# Patient Record
Sex: FEMALE | NOT HISPANIC OR LATINO | Employment: OTHER | URBAN - METROPOLITAN AREA
[De-identification: names, ages, dates, MRNs, and addresses within clinical notes are randomized per-mention and may not be internally consistent; named-entity substitution may affect disease eponyms.]

---

## 2017-12-21 ENCOUNTER — HOSPITAL ENCOUNTER (OUTPATIENT)
Dept: GENERAL RADIOLOGY | Facility: HOSPITAL | Age: 82
Discharge: HOME | End: 2017-12-21
Admitting: FAMILY MEDICINE

## 2017-12-21 ENCOUNTER — HOSPITAL ENCOUNTER (OUTPATIENT)
Dept: GENERAL RADIOLOGY | Facility: HOSPITAL | Age: 82
Discharge: HOME | End: 2017-12-21

## 2017-12-21 DIAGNOSIS — M25.571 PAIN AND SWELLING OF ANKLE, RIGHT: ICD-10-CM

## 2017-12-21 DIAGNOSIS — M25.471 PAIN AND SWELLING OF ANKLE, RIGHT: ICD-10-CM

## 2017-12-21 PROCEDURE — 73610 X-RAY EXAM OF ANKLE: CPT

## 2017-12-21 PROCEDURE — 73630 X-RAY EXAM OF FOOT: CPT

## 2018-06-07 ENCOUNTER — HOSPITAL ENCOUNTER (INPATIENT)
Facility: HOSPITAL | Age: 83
LOS: 4 days | Discharge: NURSING, REHAB OR LONG-TERM CARE | End: 2018-06-11
Attending: EMERGENCY MEDICINE | Admitting: INTERNAL MEDICINE

## 2018-06-07 ENCOUNTER — APPOINTMENT (OUTPATIENT)
Dept: CT IMAGING | Facility: HOSPITAL | Age: 83
End: 2018-06-07

## 2018-06-07 ENCOUNTER — APPOINTMENT (OUTPATIENT)
Dept: GENERAL RADIOLOGY | Facility: HOSPITAL | Age: 83
End: 2018-06-07

## 2018-06-07 DIAGNOSIS — R29.6 MULTIPLE FALLS: ICD-10-CM

## 2018-06-07 DIAGNOSIS — G93.41 METABOLIC ENCEPHALOPATHY: ICD-10-CM

## 2018-06-07 DIAGNOSIS — Z74.09 IMPAIRED FUNCTIONAL MOBILITY AND ACTIVITY TOLERANCE: ICD-10-CM

## 2018-06-07 DIAGNOSIS — N39.0 ACUTE UTI: Primary | ICD-10-CM

## 2018-06-07 LAB
ALBUMIN SERPL-MCNC: 4.2 G/DL (ref 3.5–5.2)
ALBUMIN/GLOB SERPL: 1.5 G/DL
ALP SERPL-CCNC: 48 U/L (ref 39–117)
ALT SERPL W P-5'-P-CCNC: 17 U/L (ref 1–33)
ANION GAP SERPL CALCULATED.3IONS-SCNC: 13.5 MMOL/L
AST SERPL-CCNC: 31 U/L (ref 1–32)
BACTERIA UR QL AUTO: ABNORMAL /HPF
BASOPHILS # BLD AUTO: 0.02 10*3/MM3 (ref 0–0.2)
BASOPHILS NFR BLD AUTO: 0.2 % (ref 0–1.5)
BILIRUB SERPL-MCNC: 0.6 MG/DL (ref 0.1–1.2)
BILIRUB UR QL STRIP: NEGATIVE
BUN BLD-MCNC: 29 MG/DL (ref 8–23)
BUN/CREAT SERPL: 32.2 (ref 7–25)
CALCIUM SPEC-SCNC: 9.9 MG/DL (ref 8.6–10.5)
CHLORIDE SERPL-SCNC: 97 MMOL/L (ref 98–107)
CLARITY UR: ABNORMAL
CO2 SERPL-SCNC: 28.5 MMOL/L (ref 22–29)
COLOR UR: YELLOW
CREAT BLD-MCNC: 0.9 MG/DL (ref 0.57–1)
DEPRECATED RDW RBC AUTO: 44.7 FL (ref 37–54)
EOSINOPHIL # BLD AUTO: 0.05 10*3/MM3 (ref 0–0.7)
EOSINOPHIL NFR BLD AUTO: 0.5 % (ref 0.3–6.2)
ERYTHROCYTE [DISTWIDTH] IN BLOOD BY AUTOMATED COUNT: 13.7 % (ref 11.7–13)
GFR SERPL CREATININE-BSD FRML MDRD: 59 ML/MIN/1.73
GLOBULIN UR ELPH-MCNC: 2.8 GM/DL
GLUCOSE BLD-MCNC: 101 MG/DL (ref 65–99)
GLUCOSE UR STRIP-MCNC: NEGATIVE MG/DL
HCT VFR BLD AUTO: 34.4 % (ref 35.6–45.5)
HGB BLD-MCNC: 11.4 G/DL (ref 11.9–15.5)
HGB UR QL STRIP.AUTO: NEGATIVE
HYALINE CASTS UR QL AUTO: ABNORMAL /LPF
IMM GRANULOCYTES # BLD: 0.01 10*3/MM3 (ref 0–0.03)
IMM GRANULOCYTES NFR BLD: 0.1 % (ref 0–0.5)
INR PPP: 0.99 (ref 0.9–1.1)
KETONES UR QL STRIP: ABNORMAL
LEUKOCYTE ESTERASE UR QL STRIP.AUTO: ABNORMAL
LYMPHOCYTES # BLD AUTO: 1.61 10*3/MM3 (ref 0.9–4.8)
LYMPHOCYTES NFR BLD AUTO: 16.6 % (ref 19.6–45.3)
MAGNESIUM SERPL-MCNC: 2 MG/DL (ref 1.6–2.4)
MCH RBC QN AUTO: 29 PG (ref 26.9–32)
MCHC RBC AUTO-ENTMCNC: 33.1 G/DL (ref 32.4–36.3)
MCV RBC AUTO: 87.5 FL (ref 80.5–98.2)
MONOCYTES # BLD AUTO: 0.88 10*3/MM3 (ref 0.2–1.2)
MONOCYTES NFR BLD AUTO: 9.1 % (ref 5–12)
NEUTROPHILS # BLD AUTO: 7.12 10*3/MM3 (ref 1.9–8.1)
NEUTROPHILS NFR BLD AUTO: 73.6 % (ref 42.7–76)
NITRITE UR QL STRIP: POSITIVE
PH UR STRIP.AUTO: 6 [PH] (ref 5–8)
PLATELET # BLD AUTO: 230 10*3/MM3 (ref 140–500)
PMV BLD AUTO: 10.3 FL (ref 6–12)
POTASSIUM BLD-SCNC: 3.4 MMOL/L (ref 3.5–5.2)
PROT SERPL-MCNC: 7 G/DL (ref 6–8.5)
PROT UR QL STRIP: NEGATIVE
PROTHROMBIN TIME: 12.9 SECONDS (ref 11.7–14.2)
RBC # BLD AUTO: 3.93 10*6/MM3 (ref 3.9–5.2)
RBC # UR: ABNORMAL /HPF
REF LAB TEST METHOD: ABNORMAL
SODIUM BLD-SCNC: 139 MMOL/L (ref 136–145)
SP GR UR STRIP: 1.02 (ref 1–1.03)
SQUAMOUS #/AREA URNS HPF: ABNORMAL /HPF
T4 FREE SERPL-MCNC: 1.51 NG/DL (ref 0.93–1.7)
TROPONIN T SERPL-MCNC: <0.01 NG/ML (ref 0–0.03)
TSH SERPL DL<=0.05 MIU/L-ACNC: 3.14 MIU/ML (ref 0.27–4.2)
UROBILINOGEN UR QL STRIP: ABNORMAL
WBC NRBC COR # BLD: 9.68 10*3/MM3 (ref 4.5–10.7)
WBC UR QL AUTO: ABNORMAL /HPF

## 2018-06-07 PROCEDURE — 81001 URINALYSIS AUTO W/SCOPE: CPT | Performed by: EMERGENCY MEDICINE

## 2018-06-07 PROCEDURE — 84439 ASSAY OF FREE THYROXINE: CPT | Performed by: EMERGENCY MEDICINE

## 2018-06-07 PROCEDURE — 73630 X-RAY EXAM OF FOOT: CPT

## 2018-06-07 PROCEDURE — 84443 ASSAY THYROID STIM HORMONE: CPT | Performed by: EMERGENCY MEDICINE

## 2018-06-07 PROCEDURE — 99285 EMERGENCY DEPT VISIT HI MDM: CPT

## 2018-06-07 PROCEDURE — 85610 PROTHROMBIN TIME: CPT | Performed by: EMERGENCY MEDICINE

## 2018-06-07 PROCEDURE — 87086 URINE CULTURE/COLONY COUNT: CPT | Performed by: HOSPITALIST

## 2018-06-07 PROCEDURE — 70450 CT HEAD/BRAIN W/O DYE: CPT

## 2018-06-07 PROCEDURE — 87186 SC STD MICRODIL/AGAR DIL: CPT | Performed by: HOSPITALIST

## 2018-06-07 PROCEDURE — 93010 ELECTROCARDIOGRAM REPORT: CPT | Performed by: INTERNAL MEDICINE

## 2018-06-07 PROCEDURE — 83735 ASSAY OF MAGNESIUM: CPT | Performed by: EMERGENCY MEDICINE

## 2018-06-07 PROCEDURE — 71046 X-RAY EXAM CHEST 2 VIEWS: CPT

## 2018-06-07 PROCEDURE — 85025 COMPLETE CBC W/AUTO DIFF WBC: CPT | Performed by: EMERGENCY MEDICINE

## 2018-06-07 PROCEDURE — 93005 ELECTROCARDIOGRAM TRACING: CPT | Performed by: EMERGENCY MEDICINE

## 2018-06-07 PROCEDURE — 80053 COMPREHEN METABOLIC PANEL: CPT | Performed by: EMERGENCY MEDICINE

## 2018-06-07 PROCEDURE — 25010000002 CEFTRIAXONE PER 250 MG: Performed by: EMERGENCY MEDICINE

## 2018-06-07 PROCEDURE — 84484 ASSAY OF TROPONIN QUANT: CPT | Performed by: EMERGENCY MEDICINE

## 2018-06-07 RX ORDER — HYDRALAZINE HYDROCHLORIDE 50 MG/1
50 TABLET, FILM COATED ORAL 2 TIMES DAILY
Status: DISCONTINUED | OUTPATIENT
Start: 2018-06-08 | End: 2018-06-11 | Stop reason: HOSPADM

## 2018-06-07 RX ORDER — CARVEDILOL 25 MG/1
25 TABLET ORAL 2 TIMES DAILY WITH MEALS
COMMUNITY

## 2018-06-07 RX ORDER — ISOSORBIDE MONONITRATE 60 MG/1
60 TABLET, EXTENDED RELEASE ORAL DAILY
COMMUNITY

## 2018-06-07 RX ORDER — ONDANSETRON 2 MG/ML
4 INJECTION INTRAMUSCULAR; INTRAVENOUS EVERY 6 HOURS PRN
Status: DISCONTINUED | OUTPATIENT
Start: 2018-06-07 | End: 2018-06-11 | Stop reason: HOSPADM

## 2018-06-07 RX ORDER — ATORVASTATIN CALCIUM 10 MG/1
10 TABLET, FILM COATED ORAL DAILY
COMMUNITY

## 2018-06-07 RX ORDER — ISOSORBIDE MONONITRATE 60 MG/1
60 TABLET, EXTENDED RELEASE ORAL DAILY
Status: DISCONTINUED | OUTPATIENT
Start: 2018-06-08 | End: 2018-06-11 | Stop reason: HOSPADM

## 2018-06-07 RX ORDER — HYDRALAZINE HYDROCHLORIDE 50 MG/1
50 TABLET, FILM COATED ORAL 2 TIMES DAILY
COMMUNITY

## 2018-06-07 RX ORDER — SODIUM CHLORIDE 0.9 % (FLUSH) 0.9 %
1-10 SYRINGE (ML) INJECTION AS NEEDED
Status: DISCONTINUED | OUTPATIENT
Start: 2018-06-07 | End: 2018-06-10

## 2018-06-07 RX ORDER — ONDANSETRON 4 MG/1
4 TABLET, FILM COATED ORAL EVERY 6 HOURS PRN
Status: DISCONTINUED | OUTPATIENT
Start: 2018-06-07 | End: 2018-06-11 | Stop reason: HOSPADM

## 2018-06-07 RX ORDER — CEFTRIAXONE SODIUM 1 G/50ML
1 INJECTION, SOLUTION INTRAVENOUS ONCE
Status: COMPLETED | OUTPATIENT
Start: 2018-06-07 | End: 2018-06-07

## 2018-06-07 RX ORDER — SODIUM CHLORIDE AND POTASSIUM CHLORIDE 150; 900 MG/100ML; MG/100ML
100 INJECTION, SOLUTION INTRAVENOUS CONTINUOUS
Status: DISCONTINUED | OUTPATIENT
Start: 2018-06-08 | End: 2018-06-08

## 2018-06-07 RX ORDER — CEFTRIAXONE SODIUM 1 G/50ML
1 INJECTION, SOLUTION INTRAVENOUS EVERY 24 HOURS
Status: DISCONTINUED | OUTPATIENT
Start: 2018-06-08 | End: 2018-06-09

## 2018-06-07 RX ORDER — ATORVASTATIN CALCIUM 10 MG/1
10 TABLET, FILM COATED ORAL DAILY
Status: DISCONTINUED | OUTPATIENT
Start: 2018-06-08 | End: 2018-06-11 | Stop reason: HOSPADM

## 2018-06-07 RX ORDER — CARVEDILOL 25 MG/1
25 TABLET ORAL 2 TIMES DAILY WITH MEALS
Status: DISCONTINUED | OUTPATIENT
Start: 2018-06-08 | End: 2018-06-11 | Stop reason: HOSPADM

## 2018-06-07 RX ORDER — ACETAMINOPHEN 325 MG/1
650 TABLET ORAL EVERY 4 HOURS PRN
Status: DISCONTINUED | OUTPATIENT
Start: 2018-06-07 | End: 2018-06-11 | Stop reason: HOSPADM

## 2018-06-07 RX ORDER — HYDROCHLOROTHIAZIDE 25 MG/1
25 TABLET ORAL DAILY
COMMUNITY
End: 2018-06-11 | Stop reason: HOSPADM

## 2018-06-07 RX ORDER — ONDANSETRON 4 MG/1
4 TABLET, ORALLY DISINTEGRATING ORAL EVERY 6 HOURS PRN
Status: DISCONTINUED | OUTPATIENT
Start: 2018-06-07 | End: 2018-06-11 | Stop reason: HOSPADM

## 2018-06-07 RX ORDER — SODIUM CHLORIDE 9 MG/ML
125 INJECTION, SOLUTION INTRAVENOUS CONTINUOUS
Status: DISCONTINUED | OUTPATIENT
Start: 2018-06-07 | End: 2018-06-07

## 2018-06-07 RX ORDER — SODIUM CHLORIDE 0.9 % (FLUSH) 0.9 %
10 SYRINGE (ML) INJECTION AS NEEDED
Status: DISCONTINUED | OUTPATIENT
Start: 2018-06-07 | End: 2018-06-11 | Stop reason: HOSPADM

## 2018-06-07 RX ADMIN — CEFTRIAXONE SODIUM 1 G: 1 INJECTION, SOLUTION INTRAVENOUS at 21:15

## 2018-06-07 RX ADMIN — SODIUM CHLORIDE 125 ML/HR: 9 INJECTION, SOLUTION INTRAVENOUS at 21:15

## 2018-06-07 NOTE — ED PROVIDER NOTES
" EMERGENCY DEPARTMENT ENCOUNTER    CHIEF COMPLAINT  Chief Complaint: AMS  History given by: patient, daughter at bedside.   History limited by: AMS  Room Number: P686/1  PMD: Miriam Mercado Reyes, MD      HPI:  Pt is a 87 y.o. female who presents complaining of AMS described as paranoia and a \"decline in her health\" per daughter for the past four days. Family notes increased forgetfulness and paranoia stating that people were in her house, setting off her medical alert and house alarm off several times. Family states that the patient fell two days ago and today.     Patient notes LLE weakness. Patient has had recent falls, but denies any injury from this.     Duration:  Four days ago  Onset: gradual  Timing: constant  Location: Neuro  Radiation: none  Quality: AMS  Intensity/Severity: moderate  Progression: unchanged  Associated Symptoms: paranoia, LLE weaknes  Aggravating Factors: none  Alleviating Factors: none  Previous Episodes: not stated  Treatment before arrival: none    PAST MEDICAL HISTORY  Active Ambulatory Problems     Diagnosis Date Noted   • No Active Ambulatory Problems     Resolved Ambulatory Problems     Diagnosis Date Noted   • No Resolved Ambulatory Problems     Past Medical History:   Diagnosis Date   • Coronary artery disease    • Hypertension    • Polio        PAST SURGICAL HISTORY  Past Surgical History:   Procedure Laterality Date   • CARDIAC SURGERY         FAMILY HISTORY  History reviewed. No pertinent family history.    SOCIAL HISTORY  Social History     Social History   • Marital status:      Spouse name: N/A   • Number of children: N/A   • Years of education: N/A     Occupational History   • Not on file.     Social History Main Topics   • Smoking status: Never Smoker   • Smokeless tobacco: Never Used   • Alcohol use No   • Drug use: No   • Sexual activity: Defer     Other Topics Concern   • Not on file     Social History Narrative   • No narrative on file       ALLERGIES  Sulfa " antibiotics    REVIEW OF SYSTEMS  Review of Systems   Constitutional: Negative for fever.   HENT: Negative for sore throat.    Eyes: Negative.    Respiratory: Negative for cough and shortness of breath.    Cardiovascular: Negative for chest pain.   Gastrointestinal: Negative for abdominal pain, diarrhea and vomiting.   Genitourinary: Negative for dysuria.   Musculoskeletal: Negative for neck pain.   Skin: Negative for rash.   Allergic/Immunologic: Negative.    Neurological: Positive for weakness (LLE). Negative for numbness and headaches.   Hematological: Negative.    Psychiatric/Behavioral: Positive for confusion and hallucinations.   All other systems reviewed and are negative.      PHYSICAL EXAM  ED Triage Vitals [06/07/18 1813]   Temp Heart Rate Resp BP SpO2   98.5 °F (36.9 °C) 86 16 160/80 99 %     Physical Exam   Constitutional: No distress.   HENT:   Head: Normocephalic and atraumatic.   Mouth/Throat: Mucous membranes are dry.   Eyes: EOM are normal. Pupils are equal, round, and reactive to light.   Pale conjunctiva   Neck: Normal range of motion. Neck supple.   Cardiovascular: Normal rate, regular rhythm and normal heart sounds.    Pulmonary/Chest: Effort normal and breath sounds normal. No respiratory distress.   Abdominal: Soft. Bowel sounds are normal. There is no tenderness. There is no rebound and no guarding.   Musculoskeletal: Normal range of motion. She exhibits edema (2+ in the LLE and 1+ in the LLE).   Bruise to the L flank area. No C, T, or L spine tenderness    Bruising  L fourth digit   Neurological: She is alert. She has normal sensation and normal strength.   Patient is oriented to person, not place or time.   Skin: Skin is warm and dry. No rash noted.   Psychiatric: Mood and affect normal.   Nursing note and vitals reviewed.      LAB RESULTS  Lab Results (last 24 hours)     Procedure Component Value Units Date/Time    CBC & Differential [[id number]] Collected:  06/07/18 1957    Specimen:   Blood Updated:  06/07/18 2011    Narrative:       The following orders were created for panel order CBC & Differential.  Procedure                               Abnormality         Status                     ---------                               -----------         ------                     CBC Auto Differential[[id number]]        Abnormal            Final result                 Please view results for these tests on the individual orders.    Comprehensive Metabolic Panel [[id number]]  (Abnormal) Collected:  06/07/18 1957    Specimen:  Blood Updated:  06/07/18 2030     Glucose 101 (H) mg/dL      BUN 29 (H) mg/dL      Creatinine 0.90 mg/dL      Sodium 139 mmol/L      Potassium 3.4 (L) mmol/L      Chloride 97 (L) mmol/L      CO2 28.5 mmol/L      Calcium 9.9 mg/dL      Total Protein 7.0 g/dL      Albumin 4.20 g/dL      ALT (SGPT) 17 U/L      AST (SGOT) 31 U/L      Comment: Specimen hemolyzed.  Results may be affected.        Alkaline Phosphatase 48 U/L      Total Bilirubin 0.6 mg/dL      eGFR Non African Amer 59 (L) mL/min/1.73      Globulin 2.8 gm/dL      A/G Ratio 1.5 g/dL      BUN/Creatinine Ratio 32.2 (H)     Anion Gap 13.5 mmol/L     Narrative:       The MDRD GFR formula is only valid for adults with stable renal function between ages 18 and 70.    Protime-INR [[id number]]  (Normal) Collected:  06/07/18 1957    Specimen:  Blood Updated:  06/07/18 2024     Protime 12.9 Seconds      INR 0.99    Troponin [[id number]]  (Normal) Collected:  06/07/18 1957    Specimen:  Blood Updated:  06/07/18 2042     Troponin T <0.010 ng/mL     Narrative:       Troponin T Reference Ranges:  Less than 0.03 ng/mL:    Negative for AMI  0.03 to 0.09 ng/mL:      Indeterminant for AMI  Greater than 0.09 ng/mL: Positive for AMI    Magnesium [[id number]]  (Normal) Collected:  06/07/18 1957    Specimen:  Blood Updated:  06/07/18 2030     Magnesium 2.0 mg/dL     TSH [[id number]]  (Normal) Collected:  06/07/18 1957    Specimen:  Blood Updated:   06/07/18 2044     TSH 3.140 mIU/mL     T4, Free [[id number]]  (Normal) Collected:  06/07/18 1957    Specimen:  Blood Updated:  06/07/18 2044     Free T4 1.51 ng/dL     CBC Auto Differential [[id number]]  (Abnormal) Collected:  06/07/18 1957    Specimen:  Blood Updated:  06/07/18 2011     WBC 9.68 10*3/mm3      RBC 3.93 10*6/mm3      Hemoglobin 11.4 (L) g/dL      Hematocrit 34.4 (L) %      MCV 87.5 fL      MCH 29.0 pg      MCHC 33.1 g/dL      RDW 13.7 (H) %      RDW-SD 44.7 fl      MPV 10.3 fL      Platelets 230 10*3/mm3      Neutrophil % 73.6 %      Lymphocyte % 16.6 (L) %      Monocyte % 9.1 %      Eosinophil % 0.5 %      Basophil % 0.2 %      Immature Grans % 0.1 %      Neutrophils, Absolute 7.12 10*3/mm3      Lymphocytes, Absolute 1.61 10*3/mm3      Monocytes, Absolute 0.88 10*3/mm3      Eosinophils, Absolute 0.05 10*3/mm3      Basophils, Absolute 0.02 10*3/mm3      Immature Grans, Absolute 0.01 10*3/mm3     Urinalysis With / Microscopic If Indicated (No Culture) - Urine, Catheter [[id number]]  (Abnormal) Collected:  06/07/18 2008    Specimen:  Urine from Urine, Catheter Updated:  06/07/18 2022     Color, UA Yellow     Appearance, UA Cloudy (A)     pH, UA 6.0     Specific Gravity, UA 1.024     Glucose, UA Negative     Ketones, UA Trace (A)     Bilirubin, UA Negative     Blood, UA Negative     Protein, UA Negative     Leuk Esterase, UA Moderate (2+) (A)     Nitrite, UA Positive (A)     Urobilinogen, UA 0.2 E.U./dL    Urinalysis, Microscopic Only - Urine, Clean Catch [[id number]]  (Abnormal) Collected:  06/07/18 2008    Specimen:  Urine from Urine, Catheter Updated:  06/07/18 2022     RBC, UA 3-5 (A) /HPF      WBC, UA Too Numerous to Count (A) /HPF      Bacteria, UA 4+ (A) /HPF      Squamous Epithelial Cells, UA 0-2 /HPF      Hyaline Casts, UA 7-12 /LPF      Methodology Automated Microscopy    Urine Culture - Urine, [[id number]] Collected:  06/07/18 2142    Specimen:  Urine from Urine, Clean Catch Updated:  06/07/18  2144          I ordered the above labs and reviewed the results    RADIOLOGY  CT Head Without Contrast   Final Result           No acute intracranial hemorrhage or hydrocephalus. Chronic-appearing   changes of the brain, with interval progression of chronic ischemic   changes since 2010. If there is further clinical concern, MRI could be   considered for further evaluation.       A new sclerotic focus at the right skull base, bone scan could be   considered for further evaluation.       This report was finalized on 6/7/2018 8:33 PM by Dr. Tremaine Kilgore M.D.          XR Foot 3+ View Left   Final Result       No acute fracture is identified. Soft tissue swelling. If there is   further clinical concern, MRI could be considered for further   evaluation.       This report was finalized on 6/7/2018 8:05 PM by Dr. Tremaine Kilgore M.D.          XR Chest 2 View   Final Result   No focal pulmonary consolidation. Tortuous aorta. Follow-up   as clinically indicated.       This report was finalized on 6/7/2018 8:02 PM by Dr. Tremaine Kilgore M.D.               I ordered the above noted radiological studies. Interpreted by radiologist.  Reviewed by me in PACS.       PROCEDURES  Procedures  EKG    EKG time: 1931  Rhythm/Rate: NSR, 78  No Acute Ischemia  Non-Specific ST-T changes  T wave inversion in V2-V6    unchanged compared to prior on 2010    Interpreted Contemporaneously by me.  Independently viewed by me    Patient is not a TPA candidate due to time of onset being greater than 4.5 hours.    Interval: baseline  1a. Level of Consciousness: 0-->Alert, keenly responsive  1b. LOC Questions: 1-->Answers one question correctly  1c. LOC Commands: 0-->Performs both tasks correctly  2. Best Gaze: 0-->Normal  3. Visual: 0-->No visual loss  4. Facial Palsy: 0-->Normal symmetrical movements  5a. Motor Arm, Left: 0-->No drift, limb holds 90 (or 45) degrees for full 10 secs  5b. Motor Arm, Right: 0-->No drift, limb holds 90  (or 45) degrees for full 10 secs  6a. Motor Leg, Left: 0-->No drift, leg holds 30 degree position for full 5 secs  6b. Motor Leg, Right: 0-->No drift, leg holds 30 degree position for full 5 secs  7. Limb Ataxia: 0-->Absent  8. Sensory: 0-->Normal, no sensory loss  9. Best Language: 0-->No aphasia, normal  10. Dysarthria: 0-->Normal  11. Extinction and Inattention (formerly Neglect): 0-->No abnormality    Total (NIH Stroke Scale): 1    PROGRESS AND CONSULTS     1855: I discussed plan for labs, UA, and HEad CT to be ordered in the ED. Pt and daughter understands and agrees with the plan, all questions answered.    1907: labs and radiological studies ordered.     2040: Discussed case with Dr. Jony Syed, [facility] hospitalist concerning the patient and the findings in the ED. Dr. Syed requests admittance to med-surg.     2045: rechecked pt. Family at bedside. I informed the patient ad family of UTI diagnosis, concerns for dehydration and admittance to the hospital for IV fluids and IV abx. Pt understands and agrees with the plan, all questions answered.    MEDICAL DECISION MAKING  Results were reviewed/discussed with the patient and they were also made aware of online access. Pt also made aware that some labs, such as cultures, will not be resulted during ER visit and follow up with PMD is necessary.     MDM  Number of Diagnoses or Management Options  Acute UTI:   Metabolic encephalopathy:   Multiple falls:      Amount and/or Complexity of Data Reviewed  Clinical lab tests: reviewed and ordered (UA UTI, BUN 29, creatinine 0.90, WBC 9.68, magnesium 2.0)  Tests in the radiology section of CPT®: reviewed and ordered (Head CT: no acute abnormality. Chest Xray: no acute process. Foot Xray: no osteomyelitis)  Tests in the medicine section of CPT®: reviewed and ordered (EKG Documented in procedure section)  Discuss the patient with other providers: yes (hospitalist)    Patient Progress  Patient progress: stable          DIAGNOSIS  Final diagnoses:   Acute UTI   Metabolic encephalopathy   Multiple falls       DISPOSITION  ADMISSION    Discussed treatment plan and reason for admission with pt/family and admitting physician.  Pt/family voiced understanding of the plan for admission for further testing/treatment as needed.       Latest Documented Vital Signs:  As of 1:32 AM  BP- 152/85 HR- 69 Temp- 97.7 °F (36.5 °C) (Oral) O2 sat- 91%    --  Documentation assistance provided by melba Valdez for Jovany Mccollum MD.  Information recorded by the scribe was done at my direction and has been verified and validated by me.     Jayne Valdez  06/07/18 7242       Norbert Mccollum MD  06/08/18 9123

## 2018-06-07 NOTE — ED TRIAGE NOTES
Family reports bizarre behavior for 4 days. Neighbors report multiple falls. Patient c/o dysuria and urinary frequency.

## 2018-06-08 PROBLEM — G93.41 ACUTE METABOLIC ENCEPHALOPATHY: Status: ACTIVE | Noted: 2018-06-08

## 2018-06-08 PROBLEM — I25.10 CAD (CORONARY ARTERY DISEASE): Status: ACTIVE | Noted: 2018-06-08

## 2018-06-08 PROBLEM — F03.90 DEMENTIA (HCC): Status: ACTIVE | Noted: 2018-06-08

## 2018-06-08 PROBLEM — I10 HTN (HYPERTENSION): Status: ACTIVE | Noted: 2018-06-08

## 2018-06-08 LAB
ANION GAP SERPL CALCULATED.3IONS-SCNC: 8.1 MMOL/L
BUN BLD-MCNC: 20 MG/DL (ref 8–23)
BUN/CREAT SERPL: 25.6 (ref 7–25)
CALCIUM SPEC-SCNC: 8.6 MG/DL (ref 8.6–10.5)
CHLORIDE SERPL-SCNC: 102 MMOL/L (ref 98–107)
CO2 SERPL-SCNC: 30.9 MMOL/L (ref 22–29)
CREAT BLD-MCNC: 0.78 MG/DL (ref 0.57–1)
DEPRECATED RDW RBC AUTO: 44.8 FL (ref 37–54)
EOSINOPHIL # BLD MANUAL: 0.15 10*3/MM3 (ref 0–0.7)
EOSINOPHIL NFR BLD MANUAL: 2 % (ref 0.3–6.2)
ERYTHROCYTE [DISTWIDTH] IN BLOOD BY AUTOMATED COUNT: 13.5 % (ref 11.7–13)
GFR SERPL CREATININE-BSD FRML MDRD: 70 ML/MIN/1.73
GLUCOSE BLD-MCNC: 94 MG/DL (ref 65–99)
HCT VFR BLD AUTO: 32.7 % (ref 35.6–45.5)
HGB BLD-MCNC: 10.5 G/DL (ref 11.9–15.5)
LYMPHOCYTES # BLD MANUAL: 1.89 10*3/MM3 (ref 0.9–4.8)
LYMPHOCYTES NFR BLD MANUAL: 26 % (ref 19.6–45.3)
LYMPHOCYTES NFR BLD MANUAL: 9 % (ref 5–12)
MAGNESIUM SERPL-MCNC: 2 MG/DL (ref 1.6–2.4)
MCH RBC QN AUTO: 28.9 PG (ref 26.9–32)
MCHC RBC AUTO-ENTMCNC: 32.1 G/DL (ref 32.4–36.3)
MCV RBC AUTO: 90.1 FL (ref 80.5–98.2)
MONOCYTES # BLD AUTO: 0.65 10*3/MM3 (ref 0.2–1.2)
NEUTROPHILS # BLD AUTO: 4.57 10*3/MM3 (ref 1.9–8.1)
NEUTROPHILS NFR BLD MANUAL: 63 % (ref 42.7–76)
PLAT MORPH BLD: NORMAL
PLATELET # BLD AUTO: 188 10*3/MM3 (ref 140–500)
PMV BLD AUTO: 9.9 FL (ref 6–12)
POTASSIUM BLD-SCNC: 3.4 MMOL/L (ref 3.5–5.2)
RBC # BLD AUTO: 3.63 10*6/MM3 (ref 3.9–5.2)
RBC MORPH BLD: NORMAL
SODIUM BLD-SCNC: 141 MMOL/L (ref 136–145)
WBC MORPH BLD: NORMAL
WBC NRBC COR # BLD: 7.25 10*3/MM3 (ref 4.5–10.7)

## 2018-06-08 PROCEDURE — 83735 ASSAY OF MAGNESIUM: CPT | Performed by: HOSPITALIST

## 2018-06-08 PROCEDURE — 25010000002 ENOXAPARIN PER 10 MG: Performed by: HOSPITALIST

## 2018-06-08 PROCEDURE — 80048 BASIC METABOLIC PNL TOTAL CA: CPT | Performed by: HOSPITALIST

## 2018-06-08 PROCEDURE — 25810000003 SODIUM CHLORIDE 0.9 % WITH KCL 20 MEQ 20-0.9 MEQ/L-% SOLUTION: Performed by: HOSPITALIST

## 2018-06-08 PROCEDURE — 85027 COMPLETE CBC AUTOMATED: CPT | Performed by: HOSPITALIST

## 2018-06-08 PROCEDURE — 85007 BL SMEAR W/DIFF WBC COUNT: CPT | Performed by: HOSPITALIST

## 2018-06-08 PROCEDURE — 25010000002 CEFTRIAXONE PER 250 MG: Performed by: HOSPITALIST

## 2018-06-08 RX ORDER — POTASSIUM CHLORIDE 750 MG/1
20 CAPSULE, EXTENDED RELEASE ORAL ONCE
Status: COMPLETED | OUTPATIENT
Start: 2018-06-08 | End: 2018-06-08

## 2018-06-08 RX ADMIN — CARVEDILOL 25 MG: 25 TABLET, FILM COATED ORAL at 18:07

## 2018-06-08 RX ADMIN — CEFTRIAXONE SODIUM 1 G: 1 INJECTION, SOLUTION INTRAVENOUS at 21:35

## 2018-06-08 RX ADMIN — ATORVASTATIN CALCIUM 10 MG: 10 TABLET, FILM COATED ORAL at 08:01

## 2018-06-08 RX ADMIN — POTASSIUM CHLORIDE 20 MEQ: 750 CAPSULE, EXTENDED RELEASE ORAL at 10:52

## 2018-06-08 RX ADMIN — HYDRALAZINE HYDROCHLORIDE 50 MG: 50 TABLET, FILM COATED ORAL at 04:18

## 2018-06-08 RX ADMIN — CARVEDILOL 25 MG: 25 TABLET, FILM COATED ORAL at 08:01

## 2018-06-08 RX ADMIN — ENOXAPARIN SODIUM 40 MG: 40 INJECTION SUBCUTANEOUS at 06:23

## 2018-06-08 RX ADMIN — ISOSORBIDE MONONITRATE 60 MG: 60 TABLET, EXTENDED RELEASE ORAL at 08:01

## 2018-06-08 RX ADMIN — POTASSIUM CHLORIDE AND SODIUM CHLORIDE 100 ML/HR: 900; 150 INJECTION, SOLUTION INTRAVENOUS at 04:18

## 2018-06-08 NOTE — PLAN OF CARE
Problem: Patient Care Overview  Goal: Plan of Care Review  Outcome: Ongoing (interventions implemented as appropriate)   06/08/18 0151   Coping/Psychosocial   Plan of Care Reviewed With patient;family   OTHER   Outcome Summary no c/o pain. incontinent. disoriented to situation. IV abx. bed alarm, hx of recent fall this week .

## 2018-06-08 NOTE — PROGRESS NOTES
"Discharge Planning Assessment  [facility]     Patient Name: Carly Sneed  MRN: [id number]  Today's Date: 6/8/2018    Admit Date: 6/7/2018          Discharge Needs Assessment     Row Name 06/08/18 1402       Living Environment    Lives With alone    Current Living Arrangements home/apartment/condo    Primary Care Provided by self    Provides Primary Care For no one, unable/limited ability to care for self    Family Caregiver if Needed child(carlton), adult    Quality of Family Relationships stressful    Able to Return to Prior Arrangements yes       Resource/Environmental Concerns    Transportation Concerns --   pt's dtr Tammy Oscar provides transportation       Transition Planning    Patient/Family Anticipates Transition to home    Patient/Family Anticipated Services at Transition none    Transportation Anticipated family or friend will provide       Discharge Needs Assessment    Readmission Within the Last 30 Days no previous admission in last 30 days    Concerns to be Addressed adjustment to diagnosis/illness;denies needs/concerns at this time    Equipment Currently Used at Home grab bar;shower chair;walker, rolling    Anticipated Changes Related to Illness none    Equipment Needed After Discharge none    Offered/Gave Vendor List yes            Discharge Plan     Row Name 06/08/18 3552       Plan    Plan Home w/o needs    Plan Comments I spoke with pt, she confirmed the address, PCP and Pharmacy are correct, pt said she can afford her Rx \"I have to\", pt said her dtr Tammy Oscar provides her transportation to the  and does her shopping for her. Pt said she does her own ADL and housekeeping, pt said she has never had home health and did not want HH. Pt said she has been to rehab 2 or 3 times but can't recall the facilities and does not want to go to one again.  Pt said she uses a walker, grab bars and shower chair at home.  Pt said her son Demar Carver (h. [phone]) is her POA, she said I could call him but " "she did not want me to call her dtr Tammy Oscar, she said she talks \"all sweet\" when someone is around but otherwise she talks to her mean, she said she is not abusive \"just talks mean & was not raided that way\".  Pt at first told me she would need to go for rehab at discharge then changed her mind and said she will be going home at discharge.  I called Demar Carver, his wife Angeli answered and said he is not home but she will have him return my call when he gets home, she said Demar's cell # is [phone]. Angeli faxed me a copy of pt's Living Will which was placed in her chart and in scan basket.  Angeli said pt went to [facility] once and had a bad experience, she said she would hope pt goes home at discharge due to she is contrary.  I faxed back to her how they can look up HH and SNF on the Medicare.gov in event HH or SNF is needed.  Leah Browne RN                  Demographic Summary     Row Name 06/08/18 1402       General Information    Admission Type inpatient    Arrived From home    Referral Source admission list    Reason for Consult discharge planning       Contact Information    Permission Granted to Share Info With family/designee            Functional Status     Row Name 06/08/18 1402       Functional Status, IADL    Medications independent    Meal Preparation independent    Housekeeping independent    Laundry independent    Shopping completely dependent           Patient Forms     Row Name 06/08/18 1404       Patient Forms    Provider Choice List Delivered    Delivered to Patient    Method of delivery In person          Leah Browne RN    "
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
## 2018-06-08 NOTE — PROGRESS NOTES
"     LOS: 1 day   Primary Care Physician: Miriam Mercado Reyes, MD     Subjective   Sleeping but awakens easily.  Thought she was in the nursing home.  Feels better    Vital Signs  Body mass index is 22.44 kg/m².  Temp:  [97 °F (36.1 °C)-98.5 °F (36.9 °C)] 97.3 °F (36.3 °C)  Heart Rate:  [67-86] 67  Resp:  [16] 16  BP: ()/(53-93) 94/53      Objective:  General Appearance:  In no acute distress.    Vital signs: (most recent): Blood pressure 94/53, pulse 67, temperature 97.3 °F (36.3 °C), temperature source Oral, resp. rate 16, height 160 cm (63\"), weight 57.5 kg (126 lb 11.2 oz), SpO2 97 %.    HEENT: (  Neck supple.  No lymphadenopathy.  Trachea midline.  No JVD)    Lungs:  She is not in respiratory distress.  No stridor.  There are decreased breath sounds.  No rales, wheezes or rhonchi.    Heart: Normal rate.  Regular rhythm.  Positive for murmur.  (Grade 2-3/6 systolic ejection murmur left upper sternal border)  Abdomen: Abdomen is soft and non-distended.  Bowel sounds are normal.   There is no abdominal tenderness.   There is no splenomegaly. There is no hepatomegaly.   Extremities: There is dependent edema.  (Trace to 1+)  Neurological: Patient is alert.    Skin:  Warm and dry.          Results Review:    I reviewed the patient's new clinical results.      Results from last 7 days  Lab Units 06/08/18 0637 06/07/18 1957   WBC 10*3/mm3 7.25 9.68   HEMOGLOBIN g/dL 10.5* 11.4*   PLATELETS 10*3/mm3 188 230       Results from last 7 days  Lab Units 06/08/18 0637 06/07/18 1957   SODIUM mmol/L 141 139   POTASSIUM mmol/L 3.4* 3.4*   CHLORIDE mmol/L 102 97*   CO2 mmol/L 30.9* 28.5   BUN mg/dL 20 29*   CREATININE mg/dL 0.78 0.90   CALCIUM mg/dL 8.6 9.9   GLUCOSE mg/dL 94 101*       Results from last 7 days  Lab Units 06/07/18 1957   INR  0.99     Hemoglobin A1C:No results found for: HGBA1C    Glucose Range:No results found for: POCGLU    No results found for: [id number]    Lab Results   Component Value Date    " TSH 3.140 06/07/2018       Assessment & Plan      Medication Review: Yes    Active Hospital Problems (** Indicates Principal Problem)    Diagnosis Date Noted   • CAD (coronary artery disease) [I25.10] 06/08/2018   • HTN (hypertension) [I10] 06/08/2018   • Dementia [F03.90] 06/08/2018   • Acute metabolic encephalopathy [G93.41] 06/08/2018   • Acute UTI [N39.0] 06/07/2018      Resolved Hospital Problems    Diagnosis Date Noted Date Resolved   No resolved problems to display.       Assessment/Plan  1.  Metabolic encephalopathy secondary to urinary tract infection.  Improved.  Stop IV fluids given edema.  Increase activity.  Discussed with nurse.  2.  Dementia, stable  3.  Hypertension.  Numbers noted.  No changes for now.  Follow.  4.  Coronary artery disease, stable.  Continue Imdur, statin, Coreg and hydralazine.  5.  Hypokalemia.  Oral potassium ordered ×1 dose.    Barbara Arenas MD  06/08/18  2:32 PM

## 2018-06-08 NOTE — PROGRESS NOTES
"Continued Stay Note  Psychiatric     Patient Name: Carly Sneed  MRN: [id number]  Today's Date: 6/8/2018    Admit Date: 6/7/2018          Discharge Plan     Row Name 06/08/18 7289       Plan    Plan Home with [facility]    Patient/Family in Agreement with Plan yes    Plan Comments Return call received from pt's son Demar Carver, he said it was a fignt to get his mother to come to the hospital last night and it would be a [place] to get her to go to rehab, he said it would just be better if his mother went home with home health, he said she had HH 6 yrs ago but can't recall the agency, I provided the 6 in the area and he selected [facility] (referral called to Martina). Pt updated that I spoke with her son Demar and that he agrees on the plan of home and he wanted [facility] to check on her    Row Name 06/08/18 5326       Plan    Plan     Plan Comments I spoke with pt, she confirmed the address, PCP and Pharmacy are correct, pt said she can afford her Rx \"I have to\", pt said her dtr Tammy Oscar provides her transportation to the dr and does her shopping for her. Pt said she does her own IADL and housekeeping, pt said she has never had home health and did not want HH. Pt said she has been to rehab 2 or 3 times but can't recall the facilities and does not want to go to one again.  Pt said she uses a               Discharge Codes    No documentation.           Leah Browne RN    "

## 2018-06-09 LAB — BACTERIA SPEC AEROBE CULT: ABNORMAL

## 2018-06-09 PROCEDURE — 25010000002 ENOXAPARIN PER 10 MG: Performed by: HOSPITALIST

## 2018-06-09 RX ORDER — LEVOFLOXACIN 250 MG/1
250 TABLET ORAL EVERY 24 HOURS
Status: COMPLETED | OUTPATIENT
Start: 2018-06-09 | End: 2018-06-11

## 2018-06-09 RX ADMIN — HYDRALAZINE HYDROCHLORIDE 50 MG: 50 TABLET, FILM COATED ORAL at 08:10

## 2018-06-09 RX ADMIN — ENOXAPARIN SODIUM 40 MG: 40 INJECTION SUBCUTANEOUS at 06:09

## 2018-06-09 RX ADMIN — ATORVASTATIN CALCIUM 10 MG: 10 TABLET, FILM COATED ORAL at 08:09

## 2018-06-09 RX ADMIN — LEVOFLOXACIN 250 MG: 250 TABLET, FILM COATED ORAL at 15:22

## 2018-06-09 RX ADMIN — HYDRALAZINE HYDROCHLORIDE 50 MG: 50 TABLET, FILM COATED ORAL at 20:54

## 2018-06-09 RX ADMIN — CARVEDILOL 25 MG: 25 TABLET, FILM COATED ORAL at 17:38

## 2018-06-09 RX ADMIN — CARVEDILOL 25 MG: 25 TABLET, FILM COATED ORAL at 08:09

## 2018-06-09 RX ADMIN — ISOSORBIDE MONONITRATE 60 MG: 60 TABLET, EXTENDED RELEASE ORAL at 08:10

## 2018-06-09 NOTE — PLAN OF CARE
Problem: Patient Care Overview  Goal: Plan of Care Review  Outcome: Ongoing (interventions implemented as appropriate)   06/08/18 2012   Coping/Psychosocial   Plan of Care Reviewed With patient   OTHER   Outcome Summary VSS. IV saline locked. No reports of pain or nausea. Good urine output. BM x 1. Fall precautions maintained. Up in chair majority of day.    Plan of Care Review   Progress no change     Goal: Individualization and Mutuality  Outcome: Ongoing (interventions implemented as appropriate)    Goal: Discharge Needs Assessment  Outcome: Ongoing (interventions implemented as appropriate)    Goal: Interprofessional Rounds/Family Conf  Outcome: Ongoing (interventions implemented as appropriate)      Problem: Fall Risk (Adult)  Goal: Identify Related Risk Factors and Signs and Symptoms  Outcome: Outcome(s) achieved Date Met: 06/08/18    Goal: Absence of Fall  Outcome: Ongoing (interventions implemented as appropriate)      Problem: Skin Injury Risk (Adult)  Goal: Identify Related Risk Factors and Signs and Symptoms  Outcome: Outcome(s) achieved Date Met: 06/08/18    Goal: Skin Health and Integrity  Outcome: Ongoing (interventions implemented as appropriate)

## 2018-06-09 NOTE — PROGRESS NOTES
"     LOS: 2 days   Primary Care Physician: Miriam Mercado Reyes, MD     Subjective   Feels better.  Think she is at a nursing home but then agreed it was a hospital.  Wants to walk more.  Says her ankles swell at home and are the same as they always are here.    Vital Signs  Body mass index is 22.44 kg/m².  Temp:  [97.2 °F (36.2 °C)-98.6 °F (37 °C)] 97.2 °F (36.2 °C)  Heart Rate:  [65-75] 72  Resp:  [16] 16  BP: ()/(51-88) 174/88      Objective:  General Appearance:  In no acute distress.    Vital signs: (most recent): Blood pressure 174/88, pulse 72, temperature 97.2 °F (36.2 °C), temperature source Oral, resp. rate 16, height 160 cm (63\"), weight 57.5 kg (126 lb 11.2 oz), SpO2 99 %.    Lungs:  She is not in respiratory distress.  No stridor.  There are rales and decreased breath sounds.  No wheezes or rhonchi.  (A few light rales at bases)  Heart: Normal rate.  Regular rhythm.  No murmur.   Chest: (Prominent kyphosis)  Abdomen: Abdomen is soft and non-distended.  Bowel sounds are normal.   There is no abdominal tenderness.   There is no splenomegaly. There is no hepatomegaly.   Extremities: There is dependent edema.  (Trace to 1+ at the ankles)  Neurological: Patient is alert.  (Oriented to self).          Results Review:    I reviewed the patient's new clinical results.      Results from last 7 days  Lab Units 06/08/18 0637 06/07/18 1957   WBC 10*3/mm3 7.25 9.68   HEMOGLOBIN g/dL 10.5* 11.4*   PLATELETS 10*3/mm3 188 230       Results from last 7 days  Lab Units 06/08/18 0637 06/07/18 1957   SODIUM mmol/L 141 139   POTASSIUM mmol/L 3.4* 3.4*   CHLORIDE mmol/L 102 97*   CO2 mmol/L 30.9* 28.5   BUN mg/dL 20 29*   CREATININE mg/dL 0.78 0.90   CALCIUM mg/dL 8.6 9.9   GLUCOSE mg/dL 94 101*       Results from last 7 days  Lab Units 06/07/18 1957   INR  0.99     Hemoglobin A1C:No results found for: HGBA1C    Glucose Range:No results found for: POCGLU    No results found for: [id number]    Lab Results "   Component Value Date    TSH 3.140 06/07/2018       Assessment & Plan      Medication Review: Yes    Active Hospital Problems (** Indicates Principal Problem)    Diagnosis Date Noted   • CAD (coronary artery disease) [I25.10] 06/08/2018   • HTN (hypertension) [I10] 06/08/2018   • Dementia [F03.90] 06/08/2018   • Acute metabolic encephalopathy [G93.41] 06/08/2018   • Acute UTI [N39.0] 06/07/2018      Resolved Hospital Problems    Diagnosis Date Noted Date Resolved   No resolved problems to display.       Assessment/Plan  1.  Acute metabolic encephalopathy secondary to Escherichia coli UTI superimposed on dementia.  Better.  Change to oral antibiotics.  Given the difficulties surrounding getting her to medical care and the fact that she lives alone, hold on discharge today.  Tentatively plan discharge home tomorrow with home health.  Note from CCP regarding disposition and discussion with son noted.  2.  Hypokalemia.  Oral potassium was given yesterday.  Recheck in a.m.  3.  Dehydration as evidenced by elevated specific gravity.  Resolved.  IV fluids were stopped yesterday  4.  Dementia    Barbara Arenas MD  06/09/18  10:25 AM

## 2018-06-09 NOTE — PLAN OF CARE
Problem: Patient Care Overview  Goal: Plan of Care Review  Outcome: Ongoing (interventions implemented as appropriate)   06/09/18 6556   Coping/Psychosocial   Plan of Care Reviewed With patient   OTHER   Outcome Summary vss. IV antibiotics given, BM x1. Up with assist using walker. voiding without any difficulty   Plan of Care Review   Progress no change     Goal: Individualization and Mutuality  Outcome: Ongoing (interventions implemented as appropriate)    Goal: Discharge Needs Assessment  Outcome: Ongoing (interventions implemented as appropriate)    Goal: Interprofessional Rounds/Family Conf  Outcome: Ongoing (interventions implemented as appropriate)      Problem: Fall Risk (Adult)  Goal: Absence of Fall  Outcome: Ongoing (interventions implemented as appropriate)      Problem: Skin Injury Risk (Adult)  Goal: Skin Health and Integrity  Outcome: Ongoing (interventions implemented as appropriate)

## 2018-06-09 NOTE — PLAN OF CARE
Problem: Patient Care Overview  Goal: Plan of Care Review  Outcome: Ongoing (interventions implemented as appropriate)   06/09/18 6474   Coping/Psychosocial   Plan of Care Reviewed With patient   OTHER   Outcome Summary confused, passive agressive today, called her daughter to update and daughter will be coming to visit, po antx ,up with asst/walker, falls precaution with alarms, S/L, vitals stable   Plan of Care Review   Progress no change     Goal: Individualization and Mutuality  Outcome: Ongoing (interventions implemented as appropriate)    Goal: Discharge Needs Assessment  Outcome: Ongoing (interventions implemented as appropriate)    Goal: Interprofessional Rounds/Family Conf  Outcome: Ongoing (interventions implemented as appropriate)      Problem: Fall Risk (Adult)  Goal: Absence of Fall  Outcome: Ongoing (interventions implemented as appropriate)      Problem: Skin Injury Risk (Adult)  Goal: Skin Health and Integrity  Outcome: Ongoing (interventions implemented as appropriate)

## 2018-06-10 LAB
ANION GAP SERPL CALCULATED.3IONS-SCNC: 10.3 MMOL/L
BUN BLD-MCNC: 16 MG/DL (ref 8–23)
BUN/CREAT SERPL: 24.2 (ref 7–25)
CALCIUM SPEC-SCNC: 8.7 MG/DL (ref 8.6–10.5)
CHLORIDE SERPL-SCNC: 106 MMOL/L (ref 98–107)
CO2 SERPL-SCNC: 26.7 MMOL/L (ref 22–29)
CREAT BLD-MCNC: 0.66 MG/DL (ref 0.57–1)
DEPRECATED RDW RBC AUTO: 45 FL (ref 37–54)
ERYTHROCYTE [DISTWIDTH] IN BLOOD BY AUTOMATED COUNT: 13.6 % (ref 11.7–13)
GFR SERPL CREATININE-BSD FRML MDRD: 85 ML/MIN/1.73
GLUCOSE BLD-MCNC: 107 MG/DL (ref 65–99)
HCT VFR BLD AUTO: 34.7 % (ref 35.6–45.5)
HGB BLD-MCNC: 10.9 G/DL (ref 11.9–15.5)
MCH RBC QN AUTO: 28.5 PG (ref 26.9–32)
MCHC RBC AUTO-ENTMCNC: 31.4 G/DL (ref 32.4–36.3)
MCV RBC AUTO: 90.6 FL (ref 80.5–98.2)
PLATELET # BLD AUTO: 199 10*3/MM3 (ref 140–500)
PMV BLD AUTO: 10.4 FL (ref 6–12)
POTASSIUM BLD-SCNC: 3.5 MMOL/L (ref 3.5–5.2)
RBC # BLD AUTO: 3.83 10*6/MM3 (ref 3.9–5.2)
SODIUM BLD-SCNC: 143 MMOL/L (ref 136–145)
WBC NRBC COR # BLD: 7.83 10*3/MM3 (ref 4.5–10.7)

## 2018-06-10 PROCEDURE — 80048 BASIC METABOLIC PNL TOTAL CA: CPT | Performed by: INTERNAL MEDICINE

## 2018-06-10 PROCEDURE — 25010000002 ENOXAPARIN PER 10 MG: Performed by: HOSPITALIST

## 2018-06-10 PROCEDURE — 85027 COMPLETE CBC AUTOMATED: CPT | Performed by: INTERNAL MEDICINE

## 2018-06-10 RX ORDER — POTASSIUM CHLORIDE 750 MG/1
20 CAPSULE, EXTENDED RELEASE ORAL ONCE
Status: COMPLETED | OUTPATIENT
Start: 2018-06-10 | End: 2018-06-10

## 2018-06-10 RX ADMIN — POTASSIUM CHLORIDE 20 MEQ: 750 CAPSULE, EXTENDED RELEASE ORAL at 10:37

## 2018-06-10 RX ADMIN — HYDRALAZINE HYDROCHLORIDE 50 MG: 50 TABLET, FILM COATED ORAL at 08:57

## 2018-06-10 RX ADMIN — HYDRALAZINE HYDROCHLORIDE 50 MG: 50 TABLET, FILM COATED ORAL at 20:19

## 2018-06-10 RX ADMIN — ISOSORBIDE MONONITRATE 60 MG: 60 TABLET, EXTENDED RELEASE ORAL at 08:56

## 2018-06-10 RX ADMIN — CARVEDILOL 25 MG: 25 TABLET, FILM COATED ORAL at 08:56

## 2018-06-10 RX ADMIN — LEVOFLOXACIN 250 MG: 250 TABLET, FILM COATED ORAL at 10:37

## 2018-06-10 RX ADMIN — CARVEDILOL 25 MG: 25 TABLET, FILM COATED ORAL at 17:57

## 2018-06-10 RX ADMIN — ENOXAPARIN SODIUM 40 MG: 40 INJECTION SUBCUTANEOUS at 07:06

## 2018-06-10 RX ADMIN — ATORVASTATIN CALCIUM 10 MG: 10 TABLET, FILM COATED ORAL at 08:57

## 2018-06-10 NOTE — PROGRESS NOTES
"     LOS: 3 days   Primary Care Physician: Miriam Mercado Reyes, MD     Subjective   Confused tho cooperative. Feels ok    Vital Signs  Body mass index is 22.44 kg/m².  Temp:  [97 °F (36.1 °C)-98.7 °F (37.1 °C)] 97 °F (36.1 °C)  Heart Rate:  [] 77  Resp:  [16] 16  BP: (130-164)/(66-79) 164/78    BP recheck 162 sys with manual cuff    Objective:  General Appearance:  In no acute distress.    Vital signs: (most recent): Blood pressure 164/78, pulse 77, temperature 97 °F (36.1 °C), temperature source Oral, resp. rate 16, height 160 cm (63\"), weight 57.5 kg (126 lb 11.2 oz), SpO2 95 %.    Lungs:  She is not in respiratory distress.  No stridor.  There are rales, decreased breath sounds and wheezes.  No rhonchi.  (Light scattered rales. Rare end exp wheezes)  Heart: Normal rate.  Regular rhythm.  No murmur.   Abdomen: Abdomen is soft and non-distended.  Bowel sounds are normal.   There is no abdominal tenderness.   There is no splenomegaly. There is no hepatomegaly.   Extremities: There is dependent edema.  (Trace)  Neurological: Patient is alert.  (Oriented to self).          Results Review:    I reviewed the patient's new clinical results.      Results from last 7 days  Lab Units 06/10/18  0537 06/08/18  0637   WBC 10*3/mm3 7.83 7.25   HEMOGLOBIN g/dL 10.9* 10.5*   PLATELETS 10*3/mm3 199 188       Results from last 7 days  Lab Units 06/10/18  0537 06/08/18  0637   SODIUM mmol/L 143 141   POTASSIUM mmol/L 3.5 3.4*   CHLORIDE mmol/L 106 102   CO2 mmol/L 26.7 30.9*   BUN mg/dL 16 20   CREATININE mg/dL 0.66 0.78   CALCIUM mg/dL 8.7 8.6   GLUCOSE mg/dL 107* 94       Results from last 7 days  Lab Units 06/07/18  1957   INR  0.99     Hemoglobin A1C:No results found for: HGBA1C    Glucose Range:No results found for: POCGLU    No results found for: [id number]    Lab Results   Component Value Date    TSH 3.140 06/07/2018       Assessment & Plan      Medication Review: Yes    Active Hospital Problems (** Indicates Principal " Problem)    Diagnosis Date Noted   • CAD (coronary artery disease) [I25.10] 06/08/2018   • HTN (hypertension) [I10] 06/08/2018   • Dementia [F03.90] 06/08/2018   • Acute metabolic encephalopathy [G93.41] 06/08/2018   • Acute UTI [N39.0] 06/07/2018      Resolved Hospital Problems    Diagnosis Date Noted Date Resolved   No resolved problems to display.       Assessment/Plan  1. UTI with met enceph, improved. Cont oral abx.   2. Dementia- not safe to return home alone. Pt seemed agreeable to rehab. I returned to floor when anastasia arrived and discussed at length with her: 8am- 8:23am. I left  for POA, pt's son. Will either need rehab or home with paid caretakers.  3. Hypokalemia- K ordered.  4. HTN- cont meds    40min with greater than half counseling. D/w pt, RN, and anastasia as above.     Barbara Arenas MD  06/10/18  8:27 AM    Addendum: I spoke with the son by phone later this morning.  He is the POA.  We discussed the only options for safe discharge: Rehabilitation facility versus home with paid caretakers.  He is agreeable to a trial of subacute rehabilitation.  CCP will also give information regarding resources for home care.  I discussed with the son from approximately 10:45 AM to 11:10 AM.  Also discussed with CCP.  Total time today 95 minutes.

## 2018-06-10 NOTE — PROGRESS NOTES
Continued Stay Note  [facility]     Patient Name: Carly Sneed  MRN: [id number]  Today's Date: 6/10/2018    Admit Date: 6/7/2018          Discharge Plan     Row Name 06/10/18 1804       Plan    Plan family now considering SNF--pt needs PT eval for skillable need    Patient/Family in Agreement with Plan yes   spoke with pt's son/POA Demar     Plan Comments Spoke with pt briefly at bedside. No visitors in the room at present. Discussed with pt MD's recommendation for SNF and pt was agreeable at the time. States she knew someone who went to [facility] in the past. Pt states she would like CCP to talk to her son Demar about DC planning. Placed call to pt's son Demar Carver. Demar had many questions/concerns about pt going to rehab. One question was about how SNF is paid for. Read thru the [facility] explanation of how post-hospital skilled care is covered with pt's insurance. Demar states if pt could go to rehab then he would have some time to make further arrangements for pt's long term care either at home with paid caregivers or in a facility for memory care pts. Demar requested information be emailed to him at [email or web address]. CCP to include [facility] website, Medicare.gov, [facility] [employer] website, [facility] local contact information. Demar updated that pt is nearing time for discharge and he states he will follow up with [facility] CCP on Monday. CCP to follow.............JW              Discharge Codes    No documentation.       Expected Discharge Date and Time     Expected Discharge Date Expected Discharge Time    Jun 11, 2018             Ligia Watt RN

## 2018-06-10 NOTE — PLAN OF CARE
Problem: Patient Care Overview  Goal: Plan of Care Review  Outcome: Ongoing (interventions implemented as appropriate)   06/10/18 3997   Coping/Psychosocial   Plan of Care Reviewed With patient   OTHER   Outcome Summary VSS. Upt o BR with assist and walker. BED ALARM. Possible d/c to rehab tomorrow.   Plan of Care Review   Progress improving

## 2018-06-10 NOTE — PLAN OF CARE
Problem: Patient Care Overview  Goal: Plan of Care Review  Outcome: Ongoing (interventions implemented as appropriate)   06/10/18 0424   Coping/Psychosocial   Plan of Care Reviewed With patient   OTHER   Outcome Summary pt can be agitated at times. switch to po antibiotics already. BM last night up with walker, daughter would like to speak with MD about d/c plans   Plan of Care Review   Progress no change     Goal: Individualization and Mutuality  Outcome: Ongoing (interventions implemented as appropriate)    Goal: Discharge Needs Assessment  Outcome: Ongoing (interventions implemented as appropriate)    Goal: Interprofessional Rounds/Family Conf  Outcome: Ongoing (interventions implemented as appropriate)      Problem: Fall Risk (Adult)  Goal: Absence of Fall  Outcome: Ongoing (interventions implemented as appropriate)      Problem: Skin Injury Risk (Adult)  Goal: Skin Health and Integrity  Outcome: Ongoing (interventions implemented as appropriate)

## 2018-06-11 VITALS
BODY MASS INDEX: 22.45 KG/M2 | HEIGHT: 63 IN | DIASTOLIC BLOOD PRESSURE: 93 MMHG | WEIGHT: 126.7 LBS | SYSTOLIC BLOOD PRESSURE: 180 MMHG | RESPIRATION RATE: 16 BRPM | HEART RATE: 87 BPM | TEMPERATURE: 97.2 F | OXYGEN SATURATION: 97 %

## 2018-06-11 PROCEDURE — 97161 PT EVAL LOW COMPLEX 20 MIN: CPT

## 2018-06-11 PROCEDURE — 97110 THERAPEUTIC EXERCISES: CPT

## 2018-06-11 PROCEDURE — 25010000002 ENOXAPARIN PER 10 MG: Performed by: HOSPITALIST

## 2018-06-11 RX ORDER — LEVOFLOXACIN 250 MG/1
250 TABLET ORAL DAILY
Start: 2018-06-11 | End: 2018-06-11 | Stop reason: HOSPADM

## 2018-06-11 RX ORDER — TRIAMTERENE AND HYDROCHLOROTHIAZIDE 37.5; 25 MG/1; MG/1
0.5 TABLET ORAL DAILY
Start: 2018-06-11

## 2018-06-11 RX ADMIN — ISOSORBIDE MONONITRATE 60 MG: 60 TABLET, EXTENDED RELEASE ORAL at 09:22

## 2018-06-11 RX ADMIN — ATORVASTATIN CALCIUM 10 MG: 10 TABLET, FILM COATED ORAL at 09:22

## 2018-06-11 RX ADMIN — LEVOFLOXACIN 250 MG: 250 TABLET, FILM COATED ORAL at 11:06

## 2018-06-11 RX ADMIN — HYDRALAZINE HYDROCHLORIDE 50 MG: 50 TABLET, FILM COATED ORAL at 09:22

## 2018-06-11 RX ADMIN — CARVEDILOL 25 MG: 25 TABLET, FILM COATED ORAL at 17:43

## 2018-06-11 RX ADMIN — CARVEDILOL 25 MG: 25 TABLET, FILM COATED ORAL at 07:55

## 2018-06-11 RX ADMIN — ENOXAPARIN SODIUM 40 MG: 40 INJECTION SUBCUTANEOUS at 06:15

## 2018-06-11 NOTE — THERAPY EVALUATION
Acute Care - Physical Therapy Initial Evaluation  [facility]     Patient Name: Carly Sneed  : 1931  MRN: [id number]  Today's Date: 2018   Onset of Illness/Injury or Date of Surgery: 18  Date of Referral to PT: 18  Referring Physician: Dagoberto      Admit Date: 2018    Visit Dx:     ICD-10-CM ICD-9-CM   1. Acute UTI N39.0 599.0   2. Metabolic encephalopathy G93.41 348.31   3. Multiple falls R29.6 V15.88   4. Impaired functional mobility and activity tolerance Z74.09 V49.89     Patient Active Problem List   Diagnosis   • Acute UTI   • CAD (coronary artery disease)   • HTN (hypertension)   • Dementia   • Acute metabolic encephalopathy     Past Medical History:   Diagnosis Date   • Coronary artery disease    • Hypertension    • Polio      Past Surgical History:   Procedure Laterality Date   • CARDIAC SURGERY          PT ASSESSMENT (last 12 hours)      Physical Therapy Evaluation     Row Name 18 1355          PT Evaluation Time/Intention    Subjective Information complains of;weakness  -PC     Document Type evaluation  -PC     Mode of Treatment physical therapy  -PC     Patient Effort good  -PC     Symptoms Noted During/After Treatment none  -PC     Row Name 18 9490          General Information    Patient Profile Reviewed? yes  -PC     Onset of Illness/Injury or Date of Surgery 18  -PC     Referring Physician Dagoberto  -     Patient Observations cooperative  -PC     General Observations of Patient pt is a thin female sitting in chair in no acute distress  -PC     Prior Level of Function independent:;all household mobility  -PC     Equipment Currently Used at Home rollator  -PC     Pertinent History of Current Functional Problem pt adm with AMS, falls, acute UTI  -PC     Existing Precautions/Restrictions fall  -PC     Row Name 18 3909          Cognitive Assessment/Intervention- PT/OT    Orientation Status (Cognition) oriented to;person;place;disoriented  to;situation;time  -PC     Follows Commands (Cognition) follows one step commands;over 90% accuracy  -PC     Safety Deficit (Cognitive) moderate deficit;insight into deficits/self awareness;awareness of need for assistance  -PC     Row Name 06/11/18 1357          Bed Mobility Assessment/Treatment    Comment (Bed Mobility) not assessed, pt in chair  -     Row Name 06/11/18 [facility]6          Transfer Assessment/Treatment    Transfer Assessment/Treatment sit-stand transfer;stand-sit transfer  -     Sit-Stand [place] (Transfers) minimum assist (75% patient effort)  -     Stand-Sit [place] (Transfers) minimum assist (75% patient effort)  -PC     Row Name 06/11/18 1354          Sit-Stand Transfer    Assistive Device (Sit-Stand Transfers) walker, front-wheeled  -PC     Row Name 06/11/18 [facility]6          Stand-Sit Transfer    Assistive Device (Stand-Sit Transfers) walker, front-wheeled  -PC     Row Name 06/11/18 [facility]7          Gait/Stairs Assessment/Training    [place] Level (Gait) minimum assist (75% patient effort)  -     Assistive Device (Gait) walker, front-wheeled  -PC     Distance in Feet (Gait) 50 ft  -PC     Pattern (Gait) step-through  -PC     Deviations/Abnormal Patterns (Gait) maninder decreased;stride length decreased  -PC     Bilateral Gait Deviations forward flexed posture  -PC     Comment (Gait/Stairs) pt needed assist to steer walker, difficutly with turns  -     Row Name 06/11/18 1358          General ROM    GENERAL ROM COMMENTS WFL  -     Row Name 06/11/18 [facility]4          General Assessment (Manual Muscle Testing)    Comment, General Manual Muscle Testing (MMT) Assessment general weakness from illness, dec activity tolerance  -PC     Row Name 06/11/18 1354          Plan of Care Review    Plan of Care Reviewed With patient  -PC     Row Name 06/11/18 [facility]          Physical Therapy Clinical Impression    Date of Referral to PT 06/11/18  -     Criteria for Skilled Interventions Met (PT  Clinical Impression) yes;treatment indicated  -PC     Impairments Found (describe specific impairments) gait, locomotion, and balance  -PC     Functional Limitations in Following Categories (Describe Specific Limitations) self-care;home management;community/leisure  -PC     Rehab Potential (PT Clinical Summary) fair, will monitor progress closely  -PC     Row Name 06/11/18 2984          Physical Therapy Goals    Bed Mobility Goal Selection (PT) bed mobility, PT goal 1  -PC     Transfer Goal Selection (PT) transfer, PT goal 1  -PC     Gait Training Goal Selection (PT) gait training, PT goal 1  -PC     Row Name 06/11/18 8602          Bed Mobility Goal 1 (PT)    Activity/Assistive Device (Bed Mobility Goal 1, PT) sit to supine;supine to sit  -PC     [place] Level/Cues Needed (Bed Mobility Goal 1, PT) contact guard assist  -PC     Time Frame (Bed Mobility Goal 1, PT) 1 week  -PC     Row Name 06/11/18 0326          Transfer Goal 1 (PT)    Activity/Assistive Device (Transfer Goal 1, PT) sit-to-stand/stand-to-sit  -PC     [place] Level/Cues Needed (Transfer Goal 1, PT) supervision required  -PC     Time Frame (Transfer Goal 1, PT) 1 week  -PC     Row Name 06/11/18 7527          Gait Training Goal 1 (PT)    Activity/Assistive Device (Gait Training Goal 1, PT) gait (walking locomotion);assistive device use;improve balance and speed  -PC     [place] Level (Gait Training Goal 1, PT) contact guard assist  -PC     Distance (Gait Goal 1, PT) 80 ft  -PC     Time Frame (Gait Training Goal 1, PT) 1 week  -PC     Row Name 06/11/18 9405          Positioning and Restraints    Pre-Treatment Position sitting in chair/recliner  -PC     Post Treatment Position chair  -PC     In Chair sitting;call light within reach;encouraged to call for assist;exit alarm on  -PC       User Key  (r) = Recorded By, (t) = Taken By, (c) = Cosigned By    Initials Name Provider Type    PC Carly Langley, PT Physical Therapist          Physical  Therapy Education     Title: PT OT SLP Therapies (Active)     Topic: Physical Therapy (Active)     Point: Mobility training (Active)    Learning Progress Summary     Learner Status Readiness Method Response Comment Documented by    Patient Active Acceptance E,D NR   06/11/18 1982                      User Key     Initials Effective Dates Name Provider Type Discipline     04/03/18 -  Carly Langley, PT Physical Therapist PT                PT Recommendation and Plan  Anticipated Discharge Disposition (PT): skilled nursing facility  Planned Therapy Interventions (PT Eval): balance training, bed mobility training, gait training, strengthening, transfer training  Therapy Frequency (PT Clinical Impression): daily  Outcome Summary/Treatment Plan (PT)  Anticipated Discharge Disposition (PT): skilled nursing facility  Plan of Care Reviewed With: patient  Outcome Summary: pt presents with impaired functional mobility and activity tolerance from baseline, she currently requires min assist for activity, gait was unsteady with pt needing assist to steer walker, pt is a significant fall risk, rec SNF at this time as pt not ready to return to previous living situation, pt did have significant confusion today also          Outcome Measures     Row Name 06/11/18 1400             How much help from another person do you currently need...    Turning from your back to your side while in flat bed without using bedrails? 3  -PC      Moving from lying on back to sitting on the side of a flat bed without bedrails? 3  -PC      Moving to and from a bed to a chair (including a wheelchair)? 3  -PC      Standing up from a chair using your arms (e.g., wheelchair, bedside chair)? 3  -PC      Climbing 3-5 steps with a railing? 2  -PC      To walk in hospital room? 3  -PC      AM-PAC 6 Clicks Score 17  -PC         Functional Assessment    Outcome Measure Options AM-PAC 6 Clicks Basic Mobility (PT)  -PC        User Key  (r) = Recorded By, (t) =  Taken By, (c) = Cosigned By    Initials Name Provider Type    PC Carly Langley, PT Physical Therapist           Time Calculation:         PT Charges     Row Name 06/11/18 1408             Time Calculation    Start Time 1343  -PC      Stop Time 1400  -PC      Time Calculation (min) 17 min  -PC      PT Received On 06/11/18  -PC      PT - Next Appointment 06/12/18  -PC      PT Goal Re-Cert Due Date 06/18/18  -PC        User Key  (r) = Recorded By, (t) = Taken By, (c) = Cosigned By    Initials Name Provider Type    PC Carly Langley, PT Physical Therapist          Therapy Charges for Today     Code Description Service Date Service Provider Modifiers Qty    09122077125 HC PT EVAL LOW COMPLEXITY 2 6/11/2018 Carly Langley, PT GP 1    69546197502 HC PT THER PROC EA 15 MIN 6/11/2018 Carly Langley, PT GP 1          PT G-Codes  Outcome Measure Options: AM-PAC 6 Clicks Basic Mobility (PT)      Carly Langley PT  6/11/2018

## 2018-06-11 NOTE — PROGRESS NOTES
Continued Stay Note  [facility]     Patient Name: Carly Sneed  MRN: [id number]  Today's Date: 6/11/2018    Admit Date: 6/7/2018          Discharge Plan     Row Name 06/11/18 1137       Plan    Plan SNF - [facility] referral pending     Patient/Family in Agreement with Plan yes    Plan Comments Inbound call from son Demar and he would like a referral to [facility]. Back up choice would be [facility]. Called referrals to [facility].     6/11/18 1155 - Inbound call from [facility] and they will review PT notes when available and will need to speak with family about PC/Memory Care before accepting.     6/11/18 1335 - Called to PT office and left [facility] again to try to get PT eval completed to see if patient is skilled for rehab.    6/11/18 1340 - PT staff here on the unit and they will see the patient this afternoon.     Row Name 06/11/18 0062       Plan    Plan SNF ? needs pt eval    Patient/Family in Agreement with Plan yes    Plan Comments PT has not done eval on patient yet. Followed up with patient who says she thinks she is going somewhere for rehab but not sure where. She gives me permission to call her son Demar. Called to Demar's cell [phone] amd not able to leave [facility]. Called to his home number [phone] and family says he will be home for lunch and will call me back then. His work number is [phone] for emergencies.               Discharge Codes    No documentation.       Expected Discharge Date and Time     Expected Discharge Date Expected Discharge Time    Jun 11, 2018             Ronni Gardner, JESUS ALBERTO

## 2018-06-11 NOTE — PROGRESS NOTES
Continued Stay Note  [facility]     Patient Name: Carly Sneed  MRN: [id number]  Today's Date: 6/11/2018    Admit Date: 6/7/2018          Discharge Plan     Row Name 06/11/18 1542       Plan                                       [facility] via daughter's transport    Patient/Family in Agreement with Plan yes    Plan Comments [facility] here and they have accepted patient and they can take today or tomorrow. She and I spoke with son Demar on the phone and he says his first choice is [facility] and that his sister who lives in [facility] will be here between 4-4:30 and can transport patient to facility if the MD discharges her today. Packet started and in the 6 PT CCP office.     Row Name 06/11/18 1538       Plan    Plan [facility] - has approved              Discharge Codes    No documentation.       Expected Discharge Date and Time     Expected Discharge Date Expected Discharge Time    Jun 11, 2018             Ronni Gardner RN

## 2018-06-11 NOTE — PLAN OF CARE
Problem: Patient Care Overview  Goal: Plan of Care Review  Outcome: Ongoing (interventions implemented as appropriate)   06/11/18 4667   Coping/Psychosocial   Plan of Care Reviewed With patient   OTHER   Outcome Summary pt presents with impaired functional mobility and activity tolerance from baseline, she currently requires min assist for activity, gait was unsteady with pt needing assist to steer walker, pt is a significant fall risk, rec SNF at this time as pt not ready to return to previous living situation, pt did have significant confusion today also

## 2018-06-11 NOTE — PROGRESS NOTES
Continued Stay Note  [facility]     Patient Name: Carly Sneed  MRN: [id number]  Today's Date: 6/11/2018    Admit Date: 6/7/2018          Discharge Plan     Row Name 06/11/18 1049       Plan    Plan SNF ? needs pt eval    Patient/Family in Agreement with Plan yes    Plan Comments PT has not done eval on patient yet. Followed up with patient who says she thinks she is going somewhere for rehab but not sure where. She gives me permission to call her son Demar. Called to Demar's cell [phone] amd not able to leave [facility]. Called to his home number [phone] and family says he will be home for lunch and will call me back then. His work number is [phone] for emergencies. Called to [facility] PT manager and left [facility] to be sure they got the order for a PT eval.               Discharge Codes    No documentation.       Expected Discharge Date and Time     Expected Discharge Date Expected Discharge Time    Jun 11, 2018             Ronni Gardner RN

## 2018-06-11 NOTE — DISCHARGE PLACEMENT REQUEST
"Natalia Sneed (87 y.o. Female)     Date of Birth Social Security Number Address Home Phone MRN    01/06/1931  [id number] [id number] [phone] [id number]    Spiritism Marital Status          Rastafari        Admission Date Admission Type Admitting Provider Attending Provider Department, Room/Bed    6/7/18 Emergency Jony Syed MD Hayden, Juliana, MD [address], P686/1    Discharge Date Discharge Disposition Discharge Destination                       Attending Provider:  Barbara Arenas MD    Allergies:  Sulfa Antibiotics    Isolation:  None   Infection:  None   Code Status:  FULL    Ht:  160 cm (63\")   Wt:  57.5 kg (126 lb 11.2 oz)    Admission Cmt:  None   Principal Problem:  None                Active Insurance as of 6/7/2018     Primary Coverage     Payor Plan Insurance Group Employer/Plan Group    MEDICARE MEDICARE A & B      Payor Plan Address Payor Plan Phone Number Effective From Effective To    [address] [phone] 1/1/1996     [facility] [id number]       Subscriber Name Subscriber Birth Date Member ID       NATALIA SNEED 1/6/1931 [id number]           Secondary Coverage     Payor Plan Insurance Group Employer/Plan Group    ANTH BLUE CROSS [facility] SUPP [id number]     Payor Plan Address Payor Plan Phone Number Effective From Effective To    [address]  12/1/2016     [facility] [id number]       Subscriber Name Subscriber Birth Date Member ID       NATALIA SNEED 1/6/1931 [id number]                 Emergency Contacts      (Rel.) Home Phone Work Phone Mobile Phone    Tammy Oscar (Daughter) [phone] -- --    MehulDemar (Son) [phone] -- [phone]              "

## 2018-06-11 NOTE — PLAN OF CARE
Problem: Patient Care Overview  Goal: Plan of Care Review  Outcome: Ongoing (interventions implemented as appropriate)   06/11/18 8058   Coping/Psychosocial   Plan of Care Reviewed With patient   OTHER   Outcome Summary VSS, FALL PRECAUTION MAINTAINED, UP WITH ASSIST WITH WALKER, POS D/C TO REHAB IN AM.   Plan of Care Review   Progress improving     Goal: Individualization and Mutuality  Outcome: Ongoing (interventions implemented as appropriate)    Goal: Discharge Needs Assessment  Outcome: Ongoing (interventions implemented as appropriate)      Problem: Fall Risk (Adult)  Goal: Absence of Fall  Outcome: Ongoing (interventions implemented as appropriate)      Problem: Skin Injury Risk (Adult)  Goal: Skin Health and Integrity  Outcome: Ongoing (interventions implemented as appropriate)

## 2018-06-13 NOTE — PROGRESS NOTES
Continued Stay Note  [facility]     Patient Name: Carly Sneed  MRN: [id number]  Today's Date: 6/13/2018    Admit Date: 6/7/2018          Discharge Plan     Row Name 06/13/18 0909       Plan    Plan Comments Yesterday stephany with [facility] confirmed pt was admitted 6/11/18 to skilled level.    Final Discharge Disposition Code 03 - skilled nursing facility (SNF)              Discharge Codes    No documentation.       Expected Discharge Date and Time     Expected Discharge Date Expected Discharge Time    Jun 11, 2018             Leah Browne RN